# Patient Record
Sex: MALE | Race: BLACK OR AFRICAN AMERICAN | NOT HISPANIC OR LATINO | URBAN - METROPOLITAN AREA
[De-identification: names, ages, dates, MRNs, and addresses within clinical notes are randomized per-mention and may not be internally consistent; named-entity substitution may affect disease eponyms.]

---

## 2023-07-24 ENCOUNTER — APPOINTMENT (OUTPATIENT)
Dept: LAB | Facility: LAB | Age: 38
End: 2023-07-24
Payer: COMMERCIAL

## 2023-07-24 LAB
ALANINE AMINOTRANSFERASE (SGPT) (U/L) IN SER/PLAS: 26 U/L (ref 10–52)
ALBUMIN (G/DL) IN SER/PLAS: 4.5 G/DL (ref 3.4–5)
ALKALINE PHOSPHATASE (U/L) IN SER/PLAS: 81 U/L (ref 33–120)
ANION GAP IN SER/PLAS: 12 MMOL/L (ref 10–20)
ASPARTATE AMINOTRANSFERASE (SGOT) (U/L) IN SER/PLAS: 25 U/L (ref 9–39)
BILIRUBIN TOTAL (MG/DL) IN SER/PLAS: 0.4 MG/DL (ref 0–1.2)
C REACTIVE PROTEIN (MG/L) IN SER/PLAS: 0.44 MG/DL
CALCIUM (MG/DL) IN SER/PLAS: 9.4 MG/DL (ref 8.6–10.6)
CARBON DIOXIDE, TOTAL (MMOL/L) IN SER/PLAS: 25 MMOL/L (ref 21–32)
CHLORIDE (MMOL/L) IN SER/PLAS: 105 MMOL/L (ref 98–107)
CHOLESTEROL (MG/DL) IN SER/PLAS: 131 MG/DL (ref 0–199)
CHOLESTEROL IN HDL (MG/DL) IN SER/PLAS: 36.8 MG/DL
CHOLESTEROL/HDL RATIO: 3.6
CREATININE (MG/DL) IN SER/PLAS: 1 MG/DL (ref 0.5–1.3)
ERYTHROCYTE DISTRIBUTION WIDTH (RATIO) BY AUTOMATED COUNT: 12.4 % (ref 11.5–14.5)
ERYTHROCYTE MEAN CORPUSCULAR HEMOGLOBIN CONCENTRATION (G/DL) BY AUTOMATED: 34.4 G/DL (ref 32–36)
ERYTHROCYTE MEAN CORPUSCULAR VOLUME (FL) BY AUTOMATED COUNT: 92 FL (ref 80–100)
ERYTHROCYTES (10*6/UL) IN BLOOD BY AUTOMATED COUNT: 4.51 X10E12/L (ref 4.5–5.9)
GFR MALE: >90 ML/MIN/1.73M2
GLUCOSE (MG/DL) IN SER/PLAS: 114 MG/DL (ref 74–99)
HEMATOCRIT (%) IN BLOOD BY AUTOMATED COUNT: 41.6 % (ref 41–52)
HEMOGLOBIN (G/DL) IN BLOOD: 14.3 G/DL (ref 13.5–17.5)
LDL: 71 MG/DL (ref 0–99)
LEUKOCYTES (10*3/UL) IN BLOOD BY AUTOMATED COUNT: 6 X10E9/L (ref 4.4–11.3)
NRBC (PER 100 WBCS) BY AUTOMATED COUNT: 0 /100 WBC (ref 0–0)
PLATELETS (10*3/UL) IN BLOOD AUTOMATED COUNT: 364 X10E9/L (ref 150–450)
POTASSIUM (MMOL/L) IN SER/PLAS: 4.3 MMOL/L (ref 3.5–5.3)
PROTEIN TOTAL: 7.3 G/DL (ref 6.4–8.2)
SEDIMENTATION RATE, ERYTHROCYTE: 16 MM/H (ref 0–15)
SODIUM (MMOL/L) IN SER/PLAS: 138 MMOL/L (ref 136–145)
THYROTROPIN (MIU/L) IN SER/PLAS BY DETECTION LIMIT <= 0.05 MIU/L: 1.26 MIU/L (ref 0.44–3.98)
TRIGLYCERIDE (MG/DL) IN SER/PLAS: 116 MG/DL (ref 0–149)
UREA NITROGEN (MG/DL) IN SER/PLAS: 12 MG/DL (ref 6–23)
VLDL: 23 MG/DL (ref 0–40)

## 2023-10-26 PROBLEM — F63.0: Status: ACTIVE | Noted: 2022-03-03

## 2023-10-26 PROBLEM — F12.10 MILD CANNABIS USE DISORDER: Status: ACTIVE | Noted: 2021-09-01

## 2023-10-26 PROBLEM — L63.9 ALOPECIA AREATA, UNSPECIFIED: Status: ACTIVE | Noted: 2023-07-24

## 2023-10-26 PROBLEM — K21.9 ESOPHAGEAL REFLUX: Status: ACTIVE | Noted: 2023-06-28

## 2023-10-26 PROBLEM — F11.20: Chronic | Status: ACTIVE | Noted: 2021-09-01

## 2023-10-26 PROBLEM — F17.210 CIGARETTE SMOKER: Status: ACTIVE | Noted: 2023-06-28

## 2023-10-26 PROBLEM — G47.00 INSOMNIA: Status: ACTIVE | Noted: 2021-12-25

## 2023-10-26 PROBLEM — L42 PITYRIASIS ROSEA: Status: ACTIVE | Noted: 2023-07-24

## 2023-10-26 PROBLEM — R73.03 PREDIABETES: Status: ACTIVE | Noted: 2022-04-14

## 2023-10-26 RX ORDER — BUPRENORPHINE 300 MG/1
300 SOLUTION SUBCUTANEOUS
COMMUNITY
Start: 2023-05-25

## 2023-10-26 RX ORDER — CLOBETASOL PROPIONATE 0.5 MG/G
OINTMENT TOPICAL
COMMUNITY
Start: 2023-07-24

## 2023-10-26 RX ORDER — PANTOPRAZOLE SODIUM 40 MG/1
TABLET, DELAYED RELEASE ORAL
COMMUNITY
Start: 2023-04-17

## 2023-10-26 RX ORDER — AMANTADINE HYDROCHLORIDE 100 MG/1
1 CAPSULE, GELATIN COATED ORAL 2 TIMES DAILY
COMMUNITY
Start: 2022-12-02 | End: 2023-12-02

## 2023-10-26 RX ORDER — CYCLOBENZAPRINE HCL 5 MG
5 TABLET ORAL 3 TIMES DAILY
COMMUNITY
Start: 2021-11-21

## 2023-10-26 RX ORDER — NALOXONE HYDROCHLORIDE 4 MG/.1ML
SPRAY NASAL
COMMUNITY
Start: 2021-09-01

## 2023-10-26 RX ORDER — BUPRENORPHINE HYDROCHLORIDE AND NALOXONE HYDROCHLORIDE DIHYDRATE 8; 2 MG/1; MG/1
1 TABLET SUBLINGUAL 2 TIMES DAILY
COMMUNITY
Start: 2022-01-13

## 2024-07-08 ENCOUNTER — APPOINTMENT (OUTPATIENT)
Dept: DERMATOLOGY | Facility: HOSPITAL | Age: 39
End: 2024-07-08
Payer: COMMERCIAL

## 2024-07-08 DIAGNOSIS — L63.9 ALOPECIA AREATA: Primary | ICD-10-CM

## 2024-07-08 PROCEDURE — 99214 OFFICE O/P EST MOD 30 MIN: CPT | Performed by: DERMATOLOGY

## 2024-07-08 PROCEDURE — 99214 OFFICE O/P EST MOD 30 MIN: CPT | Mod: 25 | Performed by: DERMATOLOGY

## 2024-07-08 PROCEDURE — 11900 INJECT SKIN LESIONS </W 7: CPT | Performed by: DERMATOLOGY

## 2024-07-08 PROCEDURE — 2500000004 HC RX 250 GENERAL PHARMACY W/ HCPCS (ALT 636 FOR OP/ED): Performed by: DERMATOLOGY

## 2024-07-08 RX ORDER — CLOBETASOL PROPIONATE 0.5 MG/G
CREAM TOPICAL 2 TIMES DAILY
Qty: 60 G | Refills: 3 | Status: SHIPPED | OUTPATIENT
Start: 2024-07-08

## 2024-07-08 ASSESSMENT — DERMATOLOGY QUALITY OF LIFE (QOL) ASSESSMENT
RATE HOW EMOTIONALLY BOTHERED YOU ARE BY YOUR SKIN PROBLEM (FOR EXAMPLE, WORRY, EMBARRASSMENT, FRUSTRATION): 1
RATE HOW BOTHERED YOU ARE BY EFFECTS OF YOUR SKIN PROBLEMS ON YOUR ACTIVITIES (EG, GOING OUT, ACCOMPLISHING WHAT YOU WANT, WORK ACTIVITIES OR YOUR RELATIONSHIPS WITH OTHERS): 1
RATE HOW BOTHERED YOU ARE BY SYMPTOMS OF YOUR SKIN PROBLEM (EG, ITCHING, STINGING BURNING, HURTING OR SKIN IRRITATION): 1
DATE THE QUALITY-OF-LIFE ASSESSMENT WAS COMPLETED: 67029
WHAT SINGLE SKIN CONDITION LISTED BELOW IS THE PATIENT ANSWERING THE QUALITY-OF-LIFE ASSESSMENT QUESTIONS ABOUT: ALOPECIA
ARE THERE EXCLUSIONS OR EXCEPTIONS FOR THE QUALITY OF LIFE ASSESSMENT: NO

## 2024-07-08 ASSESSMENT — DERMATOLOGY PATIENT ASSESSMENT
HAVE YOU HAD OR DO YOU HAVE VASCULAR DISEASE: NO
DO YOU USE A TANNING BED: NO
DO YOU HAVE ANY NEW OR CHANGING LESIONS: NO
ARE YOU AN ORGAN TRANSPLANT RECIPIENT: NO
HAVE YOU HAD OR DO YOU HAVE A STAPH INFECTION: NO

## 2024-07-08 ASSESSMENT — ITCH NUMERIC RATING SCALE: HOW SEVERE IS YOUR ITCHING?: 0

## 2024-07-08 ASSESSMENT — PATIENT GLOBAL ASSESSMENT (PGA): PATIENT GLOBAL ASSESSMENT: PATIENT GLOBAL ASSESSMENT:  1 - CLEAR

## 2024-07-08 NOTE — PROGRESS NOTES
Subjective     Alec De Luna is a 38 y.o. male who presents for the following: Alopecia.     Last derm visit 9/13/23 for 2nd session of ILK for alopecia areata. He had 10 patches at that visit with partial regrowth of 8 and one with worsening.     Today it is much worse with most of his scalp involved, arms, legs, and now beard.    He was addicted to percocet, was taking suboxone to help     Review of Systems:  No other skin or systemic complaints other than what is documented elsewhere in the note.    The following portions of the chart were reviewed this encounter and updated as appropriate:   Tobacco  Allergies  Meds  Problems  Med Hx  Surg Hx  Fam Hx         Skin Cancer History  No skin cancer on file.      Specialty Problems          Dermatology Problems    Alopecia areata, unspecified    Pityriasis rosea        Objective   Well appearing patient in no apparent distress; mood and affect are within normal limits.    A focused skin examination was performed. All findings within normal limits unless otherwise noted below.    Assessment/Plan   1. Alopecia areata  Scalp  Smooth, circular areas of non-scarring hair loss broadly affecting most of scalp now (>70% of scalp), 4 discrete 1-2 cm patches in beard distribution. Eyebrows and eyelashes are intact. Broadly affecting arms and legs. Approx 35% of BSA affected    -Discussed the nature of the condition  - alopecia areata with features of universalis    -Discussed treatment options including oral Erika olumiant, he will read more, information printed  - for now, proceed with ILK and topical steroid cream    -Recommend:    triamcinolone acetonide (Kenalog) injection 15 mg - Scalp      clobetasol (Temovate) 0.05 % cream - Scalp  Apply topically 2 times a day. To all areas of alopecia on scalp and on spots on face for up to 2 months then decrease to 2-3x per week for maintenance    Related Procedures  Follow Up In Dermatology - Established Patient        Follow  up 2 months ILK for alopecia areata

## 2024-09-09 ENCOUNTER — OFFICE VISIT (OUTPATIENT)
Dept: DERMATOLOGY | Facility: HOSPITAL | Age: 39
End: 2024-09-09
Payer: COMMERCIAL

## 2024-09-09 DIAGNOSIS — L63.9 ALOPECIA AREATA: ICD-10-CM

## 2024-09-09 PROCEDURE — 2500000004 HC RX 250 GENERAL PHARMACY W/ HCPCS (ALT 636 FOR OP/ED): Performed by: DERMATOLOGY

## 2024-09-09 PROCEDURE — 11900 INJECT SKIN LESIONS </W 7: CPT | Performed by: DERMATOLOGY

## 2024-09-09 PROCEDURE — 99214 OFFICE O/P EST MOD 30 MIN: CPT | Performed by: DERMATOLOGY

## 2024-09-09 RX ORDER — CLOBETASOL PROPIONATE 0.5 MG/G
CREAM TOPICAL 2 TIMES DAILY
Qty: 60 G | Refills: 3 | Status: SHIPPED | OUTPATIENT
Start: 2024-09-09

## 2024-09-09 ASSESSMENT — ITCH NUMERIC RATING SCALE: HOW SEVERE IS YOUR ITCHING?: 0

## 2024-09-09 NOTE — PROGRESS NOTES
Subjective     Alec De Luna is a 38 y.o. male who presents for the following: Alopecia (Small baby hairs on scalp. Reports loss of hair in the groin, eyelashes and eyebrows. /Patient request clobetasol be sent in as a cream, as pharmacy dispenses ointment. ). Last derm visit 7/8/24 for alopecia areata ILK. He had 2 prior sessions in spring/summer 2023.     Review of Systems:  No other skin or systemic complaints other than what is documented elsewhere in the note.    The following portions of the chart were reviewed this encounter and updated as appropriate:   Tobacco  Allergies  Meds  Problems  Med Hx  Surg Hx         Skin Cancer History  No skin cancer on file.      Specialty Problems          Dermatology Problems    Alopecia areata, unspecified    Pityriasis rosea        Objective   Well appearing patient in no apparent distress; mood and affect are within normal limits.    A focused skin examination was performed. All findings within normal limits unless otherwise noted below.    Assessment/Plan   1. Alopecia areata  Scalp  Smooth, circular areas of non-scarring hair loss broadly affecting most of scalp now (>80% of scalp), 4 discrete 1-2 cm patches in beard distribution. Eyebrows and eyelashes are intact. Broadly affecting arms and legs. Approx 35% of BSA affected    -Discussed the nature of the condition  - alopecia areata with features of universalis  - he said that he is getting depressed because of this condition and asked about paperwork for work. I can provided doctor's notes for excuses to come for appointments.     -Discussed treatment options including oral Erika olumiant, he will read more, information printed  - for now, proceed with ILK and topical steroid cream    -Recommend:    triamcinolone acetonide (Kenalog) injection 25 mg - Scalp      Intralesional injection - Scalp  Intralesional Injection:   Consent:     Consent obtained:  Verbal    Consent given by:  Patient    Risks discussed:   Poor cosmetic result, pain, infection and bleeding    Alternatives discussed:  No treatment  Pre-Procedure Details:     Prep Type:  Isopropyl alcohol  Procedure Details:   Injection:  Triamcinolone  Outcome: patient tolerated procedure well  Post-procedure details: sterile dressing applied and wound care instructions given  Dressing type: bandage   Comments:  A total of 2 ml of 10 mg/mL Kenalog were injected into 1 lesion(s) on scalp and a total of 1 ml of 5 mg/mL Kenalog were injected into 5 lesions on the beard distribution  Lot #:   Expiration:     Related Procedures  Follow Up In Dermatology - Established Patient  Follow Up In Dermatology - Established Patient    Related Medications  clobetasol (Temovate) 0.05 % cream  Apply topically 2 times a day. To all areas of alopecia on scalp and on spots on face for up to 2 months then decrease to 2-3x per week for maintenance        Follow up 6-8 weeks ILK as scheduled

## 2024-10-08 ENCOUNTER — APPOINTMENT (OUTPATIENT)
Dept: DERMATOLOGY | Facility: CLINIC | Age: 39
End: 2024-10-08
Payer: COMMERCIAL

## 2024-10-21 ENCOUNTER — LAB (OUTPATIENT)
Dept: LAB | Facility: LAB | Age: 39
End: 2024-10-21
Payer: COMMERCIAL

## 2024-10-21 ENCOUNTER — OFFICE VISIT (OUTPATIENT)
Dept: DERMATOLOGY | Facility: HOSPITAL | Age: 39
End: 2024-10-21
Payer: COMMERCIAL

## 2024-10-21 DIAGNOSIS — K21.9 GASTRO-ESOPHAGEAL REFLUX DISEASE WITHOUT ESOPHAGITIS: Primary | ICD-10-CM

## 2024-10-21 DIAGNOSIS — L63.9 ALOPECIA AREATA: ICD-10-CM

## 2024-10-21 LAB
ALBUMIN SERPL BCP-MCNC: 4.6 G/DL (ref 3.4–5)
ALP SERPL-CCNC: 90 U/L (ref 33–120)
ALT SERPL W P-5'-P-CCNC: 21 U/L (ref 10–52)
ANION GAP SERPL CALC-SCNC: 14 MMOL/L (ref 10–20)
AST SERPL W P-5'-P-CCNC: 16 U/L (ref 9–39)
BILIRUB SERPL-MCNC: 0.3 MG/DL (ref 0–1.2)
BUN SERPL-MCNC: 14 MG/DL (ref 6–23)
CALCIUM SERPL-MCNC: 9.6 MG/DL (ref 8.6–10.6)
CHLORIDE SERPL-SCNC: 102 MMOL/L (ref 98–107)
CHOLEST SERPL-MCNC: 152 MG/DL (ref 0–199)
CHOLESTEROL/HDL RATIO: 6.1
CO2 SERPL-SCNC: 22 MMOL/L (ref 21–32)
CREAT SERPL-MCNC: 1.2 MG/DL (ref 0.5–1.3)
EGFRCR SERPLBLD CKD-EPI 2021: 79 ML/MIN/1.73M*2
ERYTHROCYTE [DISTWIDTH] IN BLOOD BY AUTOMATED COUNT: 12.3 % (ref 11.5–14.5)
EST. AVERAGE GLUCOSE BLD GHB EST-MCNC: 232 MG/DL
GLUCOSE SERPL-MCNC: 251 MG/DL (ref 74–99)
HBA1C MFR BLD: 9.7 %
HCT VFR BLD AUTO: 43.2 % (ref 41–52)
HDLC SERPL-MCNC: 24.9 MG/DL
HGB BLD-MCNC: 14.7 G/DL (ref 13.5–17.5)
LDLC SERPL CALC-MCNC: 75 MG/DL
MCH RBC QN AUTO: 31.4 PG (ref 26–34)
MCHC RBC AUTO-ENTMCNC: 34 G/DL (ref 32–36)
MCV RBC AUTO: 92 FL (ref 80–100)
NON HDL CHOLESTEROL: 127 MG/DL (ref 0–149)
NRBC BLD-RTO: 0 /100 WBCS (ref 0–0)
PLATELET # BLD AUTO: 363 X10*3/UL (ref 150–450)
POTASSIUM SERPL-SCNC: 4.2 MMOL/L (ref 3.5–5.3)
PROT SERPL-MCNC: 7.3 G/DL (ref 6.4–8.2)
PSA SERPL-MCNC: 0.17 NG/ML
RBC # BLD AUTO: 4.68 X10*6/UL (ref 4.5–5.9)
SODIUM SERPL-SCNC: 134 MMOL/L (ref 136–145)
TRIGL SERPL-MCNC: 262 MG/DL (ref 0–149)
TSH SERPL-ACNC: 1.42 MIU/L (ref 0.44–3.98)
VLDL: 52 MG/DL (ref 0–40)
WBC # BLD AUTO: 8.2 X10*3/UL (ref 4.4–11.3)

## 2024-10-21 PROCEDURE — 80061 LIPID PANEL: CPT

## 2024-10-21 PROCEDURE — 11900 INJECT SKIN LESIONS </W 7: CPT | Performed by: DERMATOLOGY

## 2024-10-21 PROCEDURE — 84153 ASSAY OF PSA TOTAL: CPT

## 2024-10-21 PROCEDURE — 84443 ASSAY THYROID STIM HORMONE: CPT

## 2024-10-21 PROCEDURE — 80053 COMPREHEN METABOLIC PANEL: CPT

## 2024-10-21 PROCEDURE — 2500000004 HC RX 250 GENERAL PHARMACY W/ HCPCS (ALT 636 FOR OP/ED): Performed by: DERMATOLOGY

## 2024-10-21 PROCEDURE — 85027 COMPLETE CBC AUTOMATED: CPT

## 2024-10-21 PROCEDURE — 83036 HEMOGLOBIN GLYCOSYLATED A1C: CPT

## 2024-10-21 RX ADMIN — TRIAMCINOLONE ACETONIDE 25 MG: 10 INJECTION, SUSPENSION INTRA-ARTICULAR; INTRALESIONAL at 15:36

## 2024-10-21 ASSESSMENT — DERMATOLOGY QUALITY OF LIFE (QOL) ASSESSMENT
RATE HOW BOTHERED YOU ARE BY SYMPTOMS OF YOUR SKIN PROBLEM (EG, ITCHING, STINGING BURNING, HURTING OR SKIN IRRITATION): 1
RATE HOW BOTHERED YOU ARE BY EFFECTS OF YOUR SKIN PROBLEMS ON YOUR ACTIVITIES (EG, GOING OUT, ACCOMPLISHING WHAT YOU WANT, WORK ACTIVITIES OR YOUR RELATIONSHIPS WITH OTHERS): 1
DATE THE QUALITY-OF-LIFE ASSESSMENT WAS COMPLETED: 67134
ARE THERE EXCLUSIONS OR EXCEPTIONS FOR THE QUALITY OF LIFE ASSESSMENT: NO
RATE HOW EMOTIONALLY BOTHERED YOU ARE BY YOUR SKIN PROBLEM (FOR EXAMPLE, WORRY, EMBARRASSMENT, FRUSTRATION): 1

## 2024-10-21 ASSESSMENT — PATIENT GLOBAL ASSESSMENT (PGA): PATIENT GLOBAL ASSESSMENT: PATIENT GLOBAL ASSESSMENT:  1 - CLEAR

## 2024-10-21 ASSESSMENT — DERMATOLOGY PATIENT ASSESSMENT
ARE YOU AN ORGAN TRANSPLANT RECIPIENT: NO
DO YOU HAVE ANY NEW OR CHANGING LESIONS: NO
HAVE YOU HAD OR DO YOU HAVE VASCULAR DISEASE: NO
DO YOU USE A TANNING BED: NO
HAVE YOU HAD OR DO YOU HAVE A STAPH INFECTION: NO

## 2024-10-21 ASSESSMENT — ITCH NUMERIC RATING SCALE: HOW SEVERE IS YOUR ITCHING?: 0

## 2024-10-21 NOTE — PROGRESS NOTES
Subjective     Alec De Luna is a 38 y.o. male who presents for the following: Alopecia. He has had 2 sessions of ILK since 7/8/24. He had another 2 prior sessions spring/summer 2023 but then hiatus.     They did read about olumiant and are not interested at this time    Review of Systems:  No other skin or systemic complaints other than what is documented elsewhere in the note.    The following portions of the chart were reviewed this encounter and updated as appropriate:   Tobacco  Allergies  Meds  Problems  Med Hx  Surg Hx         Skin Cancer History  No skin cancer on file.      Specialty Problems          Dermatology Problems    Alopecia areata, unspecified    Pityriasis rosea        Objective   Well appearing patient in no apparent distress; mood and affect are within normal limits.    A focused skin examination was performed. All findings within normal limits unless otherwise noted below.    Assessment/Plan   1. Alopecia areata (5)  Left Anterior Mandible, Left Chin, Right Anterior Mandible, Right Chin, Scalp  Smooth, circular areas of non-scarring hair loss broadly affecting most of scalp now (>80% of scalp)    Beard area improving with good hair regrowth laterally.     Broadly affecting arms and legs. Approx 35% of BSA affected                  -Discussed the nature of the condition  - alopecia areata with features of universalis      -Discussed treatment options including oral Erika olumiant, they read and are not interested at this time  - for now, proceed with ILK and topical steroid cream    -using clobetasol 2x per week  - today is session #3 of ILK    -Recommend:    Intralesional injection - Scalp  Intralesional Injection:   Consent:     Consent obtained:  Verbal    Consent given by:  Patient    Risks discussed:  Poor cosmetic result, pain, infection and bleeding    Alternatives discussed:  No treatment  Pre-Procedure Details:     Prep Type:  Isopropyl alcohol  Procedure Details:   Injection:   Triamcinolone  Outcome: patient tolerated procedure well  Post-procedure details: sterile dressing applied and wound care instructions given  Dressing type: bandage   Comments:  A total of 2 ml of 10 mg/mL Kenalog were injected into 1 lesion on scalp  Another 1 ml of 5 mg/mL Kenalog was injected into 5 patches on face        triamcinolone acetonide (Kenalog) injection 25 mg - Scalp      Related Procedures  Follow Up In Dermatology - Established Patient    Related Medications  clobetasol (Temovate) 0.05 % cream  Apply topically 2 times a day. To all areas of alopecia on scalp and on spots on face for up to 2 months then decrease to 2-3x per week for maintenance        Follow up 6-8 weeks ILK as scheduled

## 2024-12-16 ENCOUNTER — OFFICE VISIT (OUTPATIENT)
Dept: DERMATOLOGY | Facility: HOSPITAL | Age: 39
End: 2024-12-16
Payer: COMMERCIAL

## 2024-12-16 DIAGNOSIS — L63.9 ALOPECIA AREATA: Primary | ICD-10-CM

## 2024-12-16 PROCEDURE — 11900 INJECT SKIN LESIONS </W 7: CPT | Performed by: DERMATOLOGY

## 2024-12-16 PROCEDURE — 2500000004 HC RX 250 GENERAL PHARMACY W/ HCPCS (ALT 636 FOR OP/ED): Mod: SE | Performed by: DERMATOLOGY

## 2024-12-16 RX ADMIN — TRIAMCINOLONE ACETONIDE 20 MG: 10 INJECTION, SUSPENSION INTRA-ARTICULAR; INTRALESIONAL at 10:22

## 2024-12-16 ASSESSMENT — DERMATOLOGY PATIENT ASSESSMENT
DO YOU HAVE ANY NEW OR CHANGING LESIONS: NO
HAVE YOU HAD OR DO YOU HAVE VASCULAR DISEASE: NO
ARE YOU AN ORGAN TRANSPLANT RECIPIENT: NO
HAVE YOU HAD OR DO YOU HAVE A STAPH INFECTION: NO
DO YOU USE A TANNING BED: NO

## 2024-12-16 ASSESSMENT — DERMATOLOGY QUALITY OF LIFE (QOL) ASSESSMENT
WHAT SINGLE SKIN CONDITION LISTED BELOW IS THE PATIENT ANSWERING THE QUALITY-OF-LIFE ASSESSMENT QUESTIONS ABOUT: ALOPECIA
RATE HOW EMOTIONALLY BOTHERED YOU ARE BY YOUR SKIN PROBLEM (FOR EXAMPLE, WORRY, EMBARRASSMENT, FRUSTRATION): 1
RATE HOW BOTHERED YOU ARE BY SYMPTOMS OF YOUR SKIN PROBLEM (EG, ITCHING, STINGING BURNING, HURTING OR SKIN IRRITATION): 1
RATE HOW BOTHERED YOU ARE BY EFFECTS OF YOUR SKIN PROBLEMS ON YOUR ACTIVITIES (EG, GOING OUT, ACCOMPLISHING WHAT YOU WANT, WORK ACTIVITIES OR YOUR RELATIONSHIPS WITH OTHERS): 1
DATE THE QUALITY-OF-LIFE ASSESSMENT WAS COMPLETED: 67190
ARE THERE EXCLUSIONS OR EXCEPTIONS FOR THE QUALITY OF LIFE ASSESSMENT: NO

## 2024-12-16 ASSESSMENT — PATIENT GLOBAL ASSESSMENT (PGA): PATIENT GLOBAL ASSESSMENT: PATIENT GLOBAL ASSESSMENT:  1 - CLEAR

## 2024-12-16 ASSESSMENT — ITCH NUMERIC RATING SCALE: HOW SEVERE IS YOUR ITCHING?: 0

## 2024-12-16 NOTE — PROGRESS NOTES
Subjective     Alec De Luna is a 39 y.o. male who presents for the following: Alopecia (Alopecia areata: scalp, chin, mandible tx: ILK, clobetasol (pt. States good response)).     Review of Systems:  No other skin or systemic complaints other than what is documented elsewhere in the note.    The following portions of the chart were reviewed this encounter and updated as appropriate:   Tobacco  Allergies  Meds  Problems  Med Hx  Surg Hx         Skin Cancer History  No skin cancer on file.      Specialty Problems          Dermatology Problems    Alopecia areata, unspecified    Pityriasis rosea        Objective   Well appearing patient in no apparent distress; mood and affect are within normal limits.    A focused skin examination was performed. All findings within normal limits unless otherwise noted below.    Assessment/Plan   1. Alopecia areata (5)  Left Anterior Mandible, Left Chin, Right Anterior Mandible, Right Chin, Scalp  Smooth, circular areas of non-scarring hair loss now with good hair regrowth. Carlson is mostly complete regrown. Scalp is partially regrown. See photos.       Broadly affecting arms and legs.                                       -Discussed the nature of the condition  - alopecia areata with features of universalis      -Previously discussed treatment options including oral Erika olumiant, they read and are not interested at this time  - for now, proceed with ILK and topical steroid cream, he is responding well    -using clobetasol 2x per week  - today is session #4 of ILK        Intralesional injection - Left Anterior Mandible, Left Chin, Right Anterior Mandible, Right Chin, Scalp  Intralesional Injection:   Consent:     Consent obtained:  Verbal    Consent given by:  Patient    Risks discussed:  Poor cosmetic result, pain, infection and bleeding    Alternatives discussed:  No treatment  Pre-Procedure Details:     Prep Type:  Isopropyl alcohol  Procedure Details:   Injection:   Triamcinolone  Outcome: patient tolerated procedure well  Post-procedure details: sterile dressing applied and wound care instructions given  Dressing type: bandage   Comments:  A total of 3 ml of 5 mg/mL Kenalog were injected into 3 lesion(s)  Lot #:   Expiration:     Related Medications  clobetasol (Temovate) 0.05 % cream  Apply topically 2 times a day. To all areas of alopecia on scalp and on spots on face for up to 2 months then decrease to 2-3x per week for maintenance    triamcinolone acetonide (Kenalog) injection 20 mg          Follow up 1/27/25 as scheduled for ILK

## 2025-01-23 ENCOUNTER — LAB (OUTPATIENT)
Dept: LAB | Facility: LAB | Age: 40
End: 2025-01-23
Payer: COMMERCIAL

## 2025-01-23 ENCOUNTER — APPOINTMENT (OUTPATIENT)
Dept: RADIOLOGY | Facility: HOSPITAL | Age: 40
End: 2025-01-23
Payer: COMMERCIAL

## 2025-01-23 ENCOUNTER — HOSPITAL ENCOUNTER (OUTPATIENT)
Dept: RADIOLOGY | Facility: HOSPITAL | Age: 40
Discharge: HOME | End: 2025-01-23
Payer: COMMERCIAL

## 2025-01-23 DIAGNOSIS — I11.9 HYPERTENSIVE HEART DISEASE WITHOUT HEART FAILURE: Primary | ICD-10-CM

## 2025-01-23 DIAGNOSIS — M25.551 PAIN IN RIGHT HIP: ICD-10-CM

## 2025-01-23 LAB
ALBUMIN SERPL BCP-MCNC: 4.5 G/DL (ref 3.4–5)
ALP SERPL-CCNC: 105 U/L (ref 33–120)
ALT SERPL W P-5'-P-CCNC: 41 U/L (ref 10–52)
ANION GAP SERPL CALCULATED.3IONS-SCNC: 13 MMOL/L (ref 10–20)
AST SERPL W P-5'-P-CCNC: 25 U/L (ref 9–39)
BILIRUB SERPL-MCNC: 0.4 MG/DL (ref 0–1.2)
BUN SERPL-MCNC: 14 MG/DL (ref 6–23)
CALCIUM SERPL-MCNC: 9.6 MG/DL (ref 8.6–10.3)
CHLORIDE SERPL-SCNC: 102 MMOL/L (ref 98–107)
CHOLEST SERPL-MCNC: 143 MG/DL (ref 0–199)
CHOLEST/HDLC SERPL: 5.1 {RATIO}
CO2 SERPL-SCNC: 24 MMOL/L (ref 21–32)
CREAT SERPL-MCNC: 1.03 MG/DL (ref 0.5–1.3)
EGFRCR SERPLBLD CKD-EPI 2021: >90 ML/MIN/1.73M*2
ERYTHROCYTE [DISTWIDTH] IN BLOOD BY AUTOMATED COUNT: 11.7 % (ref 11.5–14.5)
EST. AVERAGE GLUCOSE BLD GHB EST-MCNC: 301 MG/DL
GLUCOSE SERPL-MCNC: 323 MG/DL (ref 74–99)
GRAN CASTS #/AREA UR COMP ASSIST: ABNORMAL /LPF
HBA1C MFR BLD: 12.1 %
HCT VFR BLD AUTO: 47.3 % (ref 41–52)
HDLC SERPL-MCNC: 28.1 MG/DL
HGB BLD-MCNC: 16.7 G/DL (ref 13.5–17.5)
HYALINE CASTS #/AREA URNS AUTO: ABNORMAL /LPF
LDLC SERPL CALC-MCNC: 57 MG/DL
MCH RBC QN AUTO: 31.7 PG (ref 26–34)
MCHC RBC AUTO-ENTMCNC: 35.3 G/DL (ref 32–36)
MCV RBC AUTO: 90 FL (ref 80–100)
MUCOUS THREADS #/AREA URNS AUTO: ABNORMAL /LPF
NON HDL CHOLESTEROL: 115 MG/DL (ref 0–149)
NRBC BLD-RTO: 0 /100 WBCS (ref 0–0)
PLATELET # BLD AUTO: 355 X10*3/UL (ref 150–450)
POTASSIUM SERPL-SCNC: 4.1 MMOL/L (ref 3.5–5.3)
PROT SERPL-MCNC: 7.1 G/DL (ref 6.4–8.2)
PSA SERPL-MCNC: 0.18 NG/ML
RBC # BLD AUTO: 5.27 X10*6/UL (ref 4.5–5.9)
RBC #/AREA URNS AUTO: ABNORMAL /HPF
SODIUM SERPL-SCNC: 135 MMOL/L (ref 136–145)
TRIGL SERPL-MCNC: 290 MG/DL (ref 0–149)
TSH SERPL-ACNC: 2.63 MIU/L (ref 0.44–3.98)
VLDL: 58 MG/DL (ref 0–40)
WBC # BLD AUTO: 6.1 X10*3/UL (ref 4.4–11.3)
WBC #/AREA URNS AUTO: ABNORMAL /HPF
YEAST BUDDING #/AREA UR COMP ASSIST: PRESENT /HPF

## 2025-01-23 PROCEDURE — 73502 X-RAY EXAM HIP UNI 2-3 VIEWS: CPT | Mod: RIGHT SIDE | Performed by: RADIOLOGY

## 2025-01-23 PROCEDURE — 85027 COMPLETE CBC AUTOMATED: CPT

## 2025-01-23 PROCEDURE — 81001 URINALYSIS AUTO W/SCOPE: CPT

## 2025-01-23 PROCEDURE — 84443 ASSAY THYROID STIM HORMONE: CPT

## 2025-01-23 PROCEDURE — 80053 COMPREHEN METABOLIC PANEL: CPT

## 2025-01-23 PROCEDURE — 73502 X-RAY EXAM HIP UNI 2-3 VIEWS: CPT | Mod: RT

## 2025-01-23 PROCEDURE — 80061 LIPID PANEL: CPT
